# Patient Record
Sex: FEMALE | Race: ASIAN | NOT HISPANIC OR LATINO | Employment: FULL TIME | URBAN - METROPOLITAN AREA
[De-identification: names, ages, dates, MRNs, and addresses within clinical notes are randomized per-mention and may not be internally consistent; named-entity substitution may affect disease eponyms.]

---

## 2018-07-24 ENCOUNTER — NURSE TRIAGE (OUTPATIENT)
Dept: ADMINISTRATIVE | Facility: CLINIC | Age: 58
End: 2018-07-24

## 2018-07-24 NOTE — TELEPHONE ENCOUNTER
Saw a spot of blood in her urine. Saw a small spot when wiping self . Size of pinhead.Was seen 3 days ago. Has not seen anything else since or before  that time and only once. Advised to monitor and call back for any other signs of bleeding. Denies any bleeding in toilet or noted in urine. Only one small pinhead point when wiping self once.     Reason for Disposition   Health Information question, no triage required and triager able to answer question    Protocols used: ST INFORMATION ONLY CALL-A-AH